# Patient Record
Sex: MALE | Race: WHITE | Employment: FULL TIME | URBAN - NONMETROPOLITAN AREA
[De-identification: names, ages, dates, MRNs, and addresses within clinical notes are randomized per-mention and may not be internally consistent; named-entity substitution may affect disease eponyms.]

---

## 2024-03-12 ENCOUNTER — OFFICE VISIT (OUTPATIENT)
Dept: PRIMARY CARE CLINIC | Age: 59
End: 2024-03-12
Payer: COMMERCIAL

## 2024-03-12 VITALS
DIASTOLIC BLOOD PRESSURE: 74 MMHG | OXYGEN SATURATION: 97 % | WEIGHT: 240.6 LBS | HEART RATE: 70 BPM | HEIGHT: 69 IN | SYSTOLIC BLOOD PRESSURE: 132 MMHG | BODY MASS INDEX: 35.63 KG/M2

## 2024-03-12 DIAGNOSIS — R97.20 ELEVATED PSA: ICD-10-CM

## 2024-03-12 DIAGNOSIS — R74.8 ELEVATED CK: ICD-10-CM

## 2024-03-12 DIAGNOSIS — E78.00 HYPERCHOLESTEROLEMIA: Primary | ICD-10-CM

## 2024-03-12 DIAGNOSIS — Z12.5 SCREENING FOR PROSTATE CANCER: ICD-10-CM

## 2024-03-12 DIAGNOSIS — B35.4 RINGWORM OF BODY: ICD-10-CM

## 2024-03-12 DIAGNOSIS — E03.9 ACQUIRED HYPOTHYROIDISM: ICD-10-CM

## 2024-03-12 DIAGNOSIS — Z13.0 SCREENING, ANEMIA, DEFICIENCY, IRON: ICD-10-CM

## 2024-03-12 DIAGNOSIS — E66.9 OBESITY (BMI 35.0-39.9 WITHOUT COMORBIDITY): ICD-10-CM

## 2024-03-12 PROCEDURE — 1036F TOBACCO NON-USER: CPT | Performed by: NURSE PRACTITIONER

## 2024-03-12 PROCEDURE — G8427 DOCREV CUR MEDS BY ELIG CLIN: HCPCS | Performed by: NURSE PRACTITIONER

## 2024-03-12 PROCEDURE — G8417 CALC BMI ABV UP PARAM F/U: HCPCS | Performed by: NURSE PRACTITIONER

## 2024-03-12 PROCEDURE — 99203 OFFICE O/P NEW LOW 30 MIN: CPT | Performed by: NURSE PRACTITIONER

## 2024-03-12 PROCEDURE — G8484 FLU IMMUNIZE NO ADMIN: HCPCS | Performed by: NURSE PRACTITIONER

## 2024-03-12 PROCEDURE — 3017F COLORECTAL CA SCREEN DOC REV: CPT | Performed by: NURSE PRACTITIONER

## 2024-03-12 RX ORDER — PRENATAL VIT 91/IRON/FOLIC/DHA 28-975-200
COMBINATION PACKAGE (EA) ORAL
Qty: 42 G | Refills: 0 | Status: SHIPPED | OUTPATIENT
Start: 2024-03-12 | End: 2024-03-13 | Stop reason: SDUPTHER

## 2024-03-12 RX ORDER — OMEGA-3/DHA/EPA/FISH OIL 300-1000MG
1 CAPSULE ORAL DAILY
COMMUNITY

## 2024-03-12 RX ORDER — ASPIRIN 81 MG/1
81 TABLET ORAL DAILY
COMMUNITY

## 2024-03-12 RX ORDER — EZETIMIBE 10 MG/1
10 TABLET ORAL DAILY
COMMUNITY
Start: 2023-10-18 | End: 2024-10-17

## 2024-03-12 RX ORDER — ATORVASTATIN CALCIUM 40 MG/1
40 TABLET, FILM COATED ORAL DAILY
COMMUNITY
Start: 2023-10-18 | End: 2024-10-17

## 2024-03-12 RX ORDER — FLUTICASONE PROPIONATE 50 MCG
2 SPRAY, SUSPENSION (ML) NASAL DAILY
COMMUNITY
Start: 2022-03-29

## 2024-03-12 RX ORDER — LEVOTHYROXINE SODIUM 0.2 MG/1
200 TABLET ORAL DAILY
COMMUNITY
Start: 2023-10-18 | End: 2024-10-17

## 2024-03-12 SDOH — ECONOMIC STABILITY: INCOME INSECURITY: HOW HARD IS IT FOR YOU TO PAY FOR THE VERY BASICS LIKE FOOD, HOUSING, MEDICAL CARE, AND HEATING?: NOT HARD AT ALL

## 2024-03-12 SDOH — ECONOMIC STABILITY: FOOD INSECURITY: WITHIN THE PAST 12 MONTHS, THE FOOD YOU BOUGHT JUST DIDN'T LAST AND YOU DIDN'T HAVE MONEY TO GET MORE.: NEVER TRUE

## 2024-03-12 SDOH — ECONOMIC STABILITY: FOOD INSECURITY: WITHIN THE PAST 12 MONTHS, YOU WORRIED THAT YOUR FOOD WOULD RUN OUT BEFORE YOU GOT MONEY TO BUY MORE.: NEVER TRUE

## 2024-03-12 SDOH — ECONOMIC STABILITY: HOUSING INSECURITY
IN THE LAST 12 MONTHS, WAS THERE A TIME WHEN YOU DID NOT HAVE A STEADY PLACE TO SLEEP OR SLEPT IN A SHELTER (INCLUDING NOW)?: NO

## 2024-03-12 ASSESSMENT — PATIENT HEALTH QUESTIONNAIRE - PHQ9
SUM OF ALL RESPONSES TO PHQ QUESTIONS 1-9: 0
2. FEELING DOWN, DEPRESSED OR HOPELESS: 0
1. LITTLE INTEREST OR PLEASURE IN DOING THINGS: 0
SUM OF ALL RESPONSES TO PHQ9 QUESTIONS 1 & 2: 0
SUM OF ALL RESPONSES TO PHQ QUESTIONS 1-9: 0

## 2024-03-12 NOTE — PROGRESS NOTES
[facility] [place]  [address] [address]  Dept: [phone]  Dept Fax: [phone]    Last encounter Visit date not found    New Patient (Establish care. Not fasting for labs- last labs 10/17/23) and Rash (Itchy rash on back, left side. Started after taking atorvastatin )       HPI:   Chu Milligan is a 59 y.o. male who presentstoday for his medical conditions/complaints as noted below.  Chu Milligan is c/o of New Patient (Establish care. Not fasting for labs- last labs 10/17/23) and Rash (Itchy rash on back, left side. Started after taking atorvastatin )      HPI  New patient    59 year old male   Right handed    Children - 4 girls adults none at home.   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   Prior- Coop    Vaccination taken as child: had chicken pox   Covid 19 initial series taken,did not have covid 19 disease.     No nicotine use , never chew never vape  Alcohol use: bourbon, hard cider , wine,beer. 4-6 x year, 1 drink only   Illicit ; never    No hx hospitalization.  No surgeries.       Hx PSA elevation with bx with Dr. Brody in [place]. Last OV 2021   Needs to reconnect due for annual exam   LUTS- denies   Related to PSA Monitor  Component 12/28/21 04/29/21 10/30/20 03/19/20 09/04/19   PSA 3.97 High  5.43 High  5.39 High  4.69 High  4.02 High      Colonoscopy never done   Cologuard always done low risk.   No hemorrhoids, no blood in stool     Back rash- left side - using lamisil     Atorvastatin- high cholesterol     Acquired hypothyroid in high school onset, distal extremity weakness at onset.     TSH 0.50 10/17/2023     10/17/23:    T4, Free  0.7 - 1.7 ng/dL 1.2    Total CK  60 - 225 U/L 250 High      Lamisil using for back rash     ASA 81 mg daily   Trubiotics dust

## 2024-03-12 NOTE — PATIENT INSTRUCTIONS
To Do     1, Shingrix vaccine is a 2 dose series given 2 months apart, local pharmacy this provides 95% lifetime immunity against getting shingles disease.   Hx chicken pox as child makes you eligible.     2. Tdap  (tetanus, diptheria and pertussis) updated at local pharmacy

## 2024-03-13 ENCOUNTER — TELEPHONE (OUTPATIENT)
Dept: PRIMARY CARE CLINIC | Age: 59
End: 2024-03-13

## 2024-03-13 ENCOUNTER — NURSE ONLY (OUTPATIENT)
Dept: PRIMARY CARE CLINIC | Age: 59
End: 2024-03-13
Payer: COMMERCIAL

## 2024-03-13 ENCOUNTER — HOSPITAL ENCOUNTER (OUTPATIENT)
Age: 59
Setting detail: SPECIMEN
Discharge: HOME | End: 2024-03-13
Payer: COMMERCIAL

## 2024-03-13 DIAGNOSIS — Z12.5 SCREENING FOR PROSTATE CANCER: ICD-10-CM

## 2024-03-13 DIAGNOSIS — Z13.29 SCREENING FOR THYROID DISORDER: Primary | ICD-10-CM

## 2024-03-13 DIAGNOSIS — Z13.0 SCREENING, ANEMIA, DEFICIENCY, IRON: ICD-10-CM

## 2024-03-13 DIAGNOSIS — E78.00 HYPERCHOLESTEROLEMIA: ICD-10-CM

## 2024-03-13 DIAGNOSIS — R74.8 ELEVATED CK: ICD-10-CM

## 2024-03-13 DIAGNOSIS — E03.9 ACQUIRED HYPOTHYROIDISM: ICD-10-CM

## 2024-03-13 PROBLEM — E66.9 OBESITY (BMI 35.0-39.9 WITHOUT COMORBIDITY): Status: ACTIVE | Noted: 2024-03-13

## 2024-03-13 PROBLEM — B35.4 RINGWORM OF BODY: Status: ACTIVE | Noted: 2024-03-13

## 2024-03-13 LAB
ALBUMIN SERPL-MCNC: 4.4 G/DL (ref 3.5–5.2)
ALBUMIN/GLOB SERPL: 1.5 {RATIO} (ref 1–2.5)
ALP SERPL-CCNC: 77 U/L (ref 40–129)
ALT SERPL-CCNC: 47 U/L (ref 5–41)
ANION GAP SERPL CALCULATED.3IONS-SCNC: 11 MMOL/L (ref 9–17)
AST SERPL-CCNC: 30 U/L
BASOPHILS # BLD: 0.07 K/UL (ref 0–0.2)
BASOPHILS NFR BLD: 1 % (ref 0–2)
BILIRUB DIRECT SERPL-MCNC: <0.1 MG/DL
BILIRUB INDIRECT SERPL-MCNC: ABNORMAL MG/DL (ref 0–1)
BILIRUB SERPL-MCNC: 0.6 MG/DL (ref 0.3–1.2)
BUN SERPL-MCNC: 17 MG/DL (ref 6–20)
BUN/CREAT SERPL: 21 (ref 9–20)
CALCIUM SERPL-MCNC: 9.4 MG/DL (ref 8.6–10.4)
CHLORIDE SERPL-SCNC: 102 MMOL/L (ref 98–107)
CK SERPL-CCNC: 200 U/L (ref 39–308)
CO2 SERPL-SCNC: 28 MMOL/L (ref 20–31)
CREAT SERPL-MCNC: 0.8 MG/DL (ref 0.7–1.2)
EOSINOPHIL # BLD: 0.29 K/UL (ref 0–0.44)
EOSINOPHILS RELATIVE PERCENT: 6 % (ref 1–4)
ERYTHROCYTE [DISTWIDTH] IN BLOOD BY AUTOMATED COUNT: 12.5 % (ref 11.8–14.4)
GFR SERPL CREATININE-BSD FRML MDRD: >60 ML/MIN/1.73M2
GLUCOSE SERPL-MCNC: 98 MG/DL (ref 70–99)
HCT VFR BLD AUTO: 44.9 % (ref 40.7–50.3)
HGB BLD-MCNC: 14.5 G/DL (ref 13–17)
IMM GRANULOCYTES # BLD AUTO: <0.03 K/UL (ref 0–0.3)
IMM GRANULOCYTES NFR BLD: 0 %
LYMPHOCYTES NFR BLD: 1.78 K/UL (ref 1.1–3.7)
LYMPHOCYTES RELATIVE PERCENT: 35 % (ref 24–43)
MCH RBC QN AUTO: 29.9 PG (ref 25.2–33.5)
MCHC RBC AUTO-ENTMCNC: 32.3 G/DL (ref 28.4–34.8)
MCV RBC AUTO: 92.6 FL (ref 82.6–102.9)
MONOCYTES NFR BLD: 0.5 K/UL (ref 0.1–1.2)
MONOCYTES NFR BLD: 10 % (ref 3–12)
NEUTROPHILS NFR BLD: 48 % (ref 36–65)
NEUTS SEG NFR BLD: 2.38 K/UL (ref 1.5–8.1)
NRBC BLD-RTO: 0 PER 100 WBC
PLATELET # BLD AUTO: 246 K/UL (ref 138–453)
PMV BLD AUTO: 10.1 FL (ref 8.1–13.5)
POTASSIUM SERPL-SCNC: 4.7 MMOL/L (ref 3.7–5.3)
PROT SERPL-MCNC: 7.3 G/DL (ref 6.4–8.3)
RBC # BLD AUTO: 4.85 M/UL (ref 4.21–5.77)
SODIUM SERPL-SCNC: 141 MMOL/L (ref 135–144)
TSH SERPL DL<=0.05 MIU/L-ACNC: 0.65 UIU/ML (ref 0.3–5)
WBC OTHER # BLD: 5 K/UL (ref 3.5–11.3)

## 2024-03-13 PROCEDURE — G0103 PSA SCREENING: HCPCS

## 2024-03-13 PROCEDURE — 80048 BASIC METABOLIC PNL TOTAL CA: CPT

## 2024-03-13 PROCEDURE — 84443 ASSAY THYROID STIM HORMONE: CPT

## 2024-03-13 PROCEDURE — 86376 MICROSOMAL ANTIBODY EACH: CPT

## 2024-03-13 PROCEDURE — 80061 LIPID PANEL: CPT

## 2024-03-13 PROCEDURE — 36415 COLL VENOUS BLD VENIPUNCTURE: CPT | Performed by: NURSE PRACTITIONER

## 2024-03-13 PROCEDURE — 82550 ASSAY OF CK (CPK): CPT

## 2024-03-13 PROCEDURE — 86800 THYROGLOBULIN ANTIBODY: CPT

## 2024-03-13 PROCEDURE — 80076 HEPATIC FUNCTION PANEL: CPT

## 2024-03-13 PROCEDURE — 85025 COMPLETE CBC W/AUTO DIFF WBC: CPT

## 2024-03-13 RX ORDER — PRENATAL VIT 91/IRON/FOLIC/DHA 28-975-200
COMBINATION PACKAGE (EA) ORAL
Qty: 42 G | Refills: 0 | Status: CANCELLED | OUTPATIENT
Start: 2024-03-13

## 2024-03-13 RX ORDER — PRENATAL VIT 91/IRON/FOLIC/DHA 28-975-200
COMBINATION PACKAGE (EA) ORAL
Qty: 42 G | Refills: 0 | Status: SHIPPED | OUTPATIENT
Start: 2024-03-13

## 2024-03-13 ASSESSMENT — ENCOUNTER SYMPTOMS
EYES NEGATIVE: 1
DIARRHEA: 0
SINUS PRESSURE: 0
COUGH: 1
ABDOMINAL PAIN: 0
RHINORRHEA: 0
SINUS PAIN: 0
SORE THROAT: 1

## 2024-03-13 NOTE — TELEPHONE ENCOUNTER
----- Message from Laurel Child sent at 3/13/2024  4:16 PM EDT -----  Subject: Refill Request    QUESTIONS  Name of Medication? terbinafine (LAMISIL) 1 % cream  Patient-reported dosage and instructions? Apply topically 2 times daily to   left posterior back ringworm x 10 days and   How many days do you have left? 0  Preferred Pharmacy? [id number]  Pharmacy phone number (if available)? [phone]  Additional Information for Provider? This medication was sent to [facility]   pharmacy, but "[facility] doesn't accept patients insurance anymore please   advise and would like it sent over to [facility].   ---------------------------------------------------------------------------  --------------  CALL BACK INFO  What is the best way for the office to contact you? OK to leave message on   voicemail  Preferred Call Back Phone Number? [phone]  ---------------------------------------------------------------------------  --------------  SCRIPT ANSWERS  Relationship to Patient? Spouse/Partner  Representative Name? Lyubov Milligan  Is the representative on the Communication Release of Information (HERMELINDA)   form in Epic? Yes

## 2024-03-14 LAB
CHOLEST SERPL-MCNC: 155 MG/DL (ref 0–199)
CHOLESTEROL/HDL RATIO: 4
HDLC SERPL-MCNC: 40 MG/DL
LDLC SERPL CALC-MCNC: 91 MG/DL (ref 0–100)
PSA SERPL-MCNC: 3.8 NG/ML (ref 0–4)
TRIGL SERPL-MCNC: 116 MG/DL
VLDLC SERPL CALC-MCNC: 23 MG/DL

## 2024-03-15 DIAGNOSIS — E78.00 HYPERCHOLESTEROLEMIA: ICD-10-CM

## 2024-03-15 DIAGNOSIS — R74.8 ELEVATED CK: Primary | ICD-10-CM

## 2024-03-15 RX ORDER — ROSUVASTATIN CALCIUM 20 MG/1
20 TABLET, COATED ORAL NIGHTLY
Qty: 90 TABLET | Refills: 0 | Status: SHIPPED | OUTPATIENT
Start: 2024-03-15

## 2024-03-17 LAB
THYROGLOBULIN AB: 40 IU/ML (ref 0–40)
THYROPEROXIDASE AB SERPL IA-ACNC: 174 IU/ML (ref 0–25)

## 2024-03-27 ASSESSMENT — ENCOUNTER SYMPTOMS
ENDOCRINE NEGATIVE: 1
NAUSEA: 0
SHORTNESS OF BREATH: 0
ALLERGIC/IMMUNOLOGIC NEGATIVE: 1
FEVER: 0
COUGH: 0
PSYCHIATRIC NEGATIVE: 1
CHILLS: 0
DIFFICULTY URINATING: 0
EYES NEGATIVE: 1

## 2024-03-27 NOTE — PROGRESS NOTES
Subjective   Patient ID: Gama Escobar is a 59 y.o. male.    HPI  Patient has hx of elevated PSA. Most recent PSA was 3.80 on 3/24. Prior PSA was 3.97 on 12/21..Prior PSA was 5.43 on 4/2021. Prior PSA was 5.39 on 10/2020. Prior PSA was 4.69 on 9/2019. Patient had a negative bx on 6/2020. No fhx of prostate ca. . Denies weight loss. Denies bone pain. Chronic BPH sx are mild and stable. Denies urgency and frequency. Denies dysuria. Denies hematuria. Nocturia x1. Pt. does not take any medication for LUT'S. ED is mild. No medication for this.       Review of Systems   Constitutional:  Negative for chills and fever.   HENT: Negative.     Eyes: Negative.    Respiratory:  Negative for cough and shortness of breath.    Cardiovascular:  Negative for chest pain and leg swelling.   Gastrointestinal:  Negative for nausea.   Endocrine: Negative.    Genitourinary:  Negative for difficulty urinating.        Negative except for documented in HPI   Allergic/Immunologic: Negative.    Neurological:         Alert & oriented X 3   Hematological:         Denies blood thinners   Psychiatric/Behavioral: Negative.         Objective   Physical Exam  Vitals and nursing note reviewed.   Constitutional:       General: He is not in acute distress.     Appearance: Normal appearance.   Pulmonary:      Effort: Pulmonary effort is normal.   Abdominal:      Tenderness: There is no abdominal tenderness.   Genitourinary:     Comments: Kidneys non palpable bilaterally  Bladder non palpable or tender  Scrotum no mass, No hydrocele  Epididymis- No spermatocele. Non Tender.  Testicles: No mass. WNL  Urethra: No discharge  Penis within normal limits... No lesions. circumcised  Prostate - symmetric, no nodules. BENIGN  Seminal Vesicals: No mass.  Sphincter tone: normal  Neurological:      Mental Status: He is alert.         Assessment/Plan   Diagnoses and all orders for this visit:  Nocturia  Erectile dysfunction, unspecified erectile dysfunction  type  Elevated PSA  Benign prostatic hyperplasia without urinary obstruction      All available PSA values reviewed, Options discussed. Questions answered.  Discussed MRI  Pros and cons of prostate biopsy reviewed. Other options discussed. Questions answered  Past Bx reviewed   Diet changes for prostate health discussed and educational information given. Pros/Cons of prostate health supplements discussed.   Treatment options for LUTS reviewed  Discussed timed voiding. Discussed fluid and caffeine intake  Treatment options for ED reviewed.  Lifestyle change to help prevent UTIs discussed. Encouraged fluid intake.    F/U  1 year with PSA

## 2024-03-28 ENCOUNTER — OFFICE VISIT (OUTPATIENT)
Dept: UROLOGY | Facility: CLINIC | Age: 59
End: 2024-03-28
Payer: COMMERCIAL

## 2024-03-28 VITALS — HEIGHT: 68 IN | RESPIRATION RATE: 16 BRPM | WEIGHT: 245 LBS | BODY MASS INDEX: 37.13 KG/M2

## 2024-03-28 DIAGNOSIS — N52.9 ERECTILE DYSFUNCTION, UNSPECIFIED ERECTILE DYSFUNCTION TYPE: ICD-10-CM

## 2024-03-28 DIAGNOSIS — R35.1 NOCTURIA: ICD-10-CM

## 2024-03-28 DIAGNOSIS — R97.20 ELEVATED PSA: ICD-10-CM

## 2024-03-28 DIAGNOSIS — N40.0 BENIGN PROSTATIC HYPERPLASIA WITHOUT URINARY OBSTRUCTION: ICD-10-CM

## 2024-03-28 PROCEDURE — 99214 OFFICE O/P EST MOD 30 MIN: CPT | Performed by: UROLOGY

## 2024-03-28 PROCEDURE — 1036F TOBACCO NON-USER: CPT | Performed by: UROLOGY

## 2024-03-28 RX ORDER — EZETIMIBE 10 MG/1
1 TABLET ORAL DAILY
COMMUNITY
Start: 2023-10-18 | End: 2024-10-17

## 2024-03-28 RX ORDER — ROSUVASTATIN CALCIUM 20 MG/1
20 TABLET, COATED ORAL
COMMUNITY
Start: 2024-03-15

## 2024-03-28 RX ORDER — LEVOTHYROXINE SODIUM 200 UG/1
1 TABLET ORAL DAILY
COMMUNITY
Start: 2023-10-18 | End: 2024-10-17

## 2024-03-28 RX ORDER — ASPIRIN 81 MG/1
1 TABLET ORAL DAILY
COMMUNITY

## 2024-03-28 RX ORDER — SAW PALMETTO 160 MG
CAPSULE ORAL
COMMUNITY

## 2024-03-28 RX ORDER — CIDER VINEGAR 300 MG
1 TABLET ORAL DAILY
COMMUNITY

## 2024-03-28 RX ORDER — FLUTICASONE PROPIONATE 50 MCG
2 SPRAY, SUSPENSION (ML) NASAL DAILY
COMMUNITY
Start: 2022-03-29

## 2024-03-28 RX ORDER — CALCIUM CARBONATE 600 MG
600 TABLET ORAL
COMMUNITY

## 2024-03-28 RX ORDER — ATORVASTATIN CALCIUM 40 MG/1
1 TABLET, FILM COATED ORAL DAILY
COMMUNITY
Start: 2023-10-18 | End: 2024-10-17

## 2024-04-25 ENCOUNTER — OFFICE VISIT (OUTPATIENT)
Dept: PRIMARY CARE CLINIC | Age: 59
End: 2024-04-25
Payer: COMMERCIAL

## 2024-04-25 VITALS
HEART RATE: 71 BPM | DIASTOLIC BLOOD PRESSURE: 82 MMHG | OXYGEN SATURATION: 97 % | BODY MASS INDEX: 36.14 KG/M2 | SYSTOLIC BLOOD PRESSURE: 118 MMHG | HEIGHT: 69 IN | WEIGHT: 244 LBS

## 2024-04-25 DIAGNOSIS — E66.9 OBESITY (BMI 35.0-39.9 WITHOUT COMORBIDITY): Primary | ICD-10-CM

## 2024-04-25 DIAGNOSIS — E03.9 ACQUIRED HYPOTHYROIDISM: ICD-10-CM

## 2024-04-25 DIAGNOSIS — B35.4 TINEA CORPORIS: ICD-10-CM

## 2024-04-25 PROCEDURE — 99213 OFFICE O/P EST LOW 20 MIN: CPT | Performed by: NURSE PRACTITIONER

## 2024-04-25 PROCEDURE — G8417 CALC BMI ABV UP PARAM F/U: HCPCS | Performed by: NURSE PRACTITIONER

## 2024-04-25 PROCEDURE — 1036F TOBACCO NON-USER: CPT | Performed by: NURSE PRACTITIONER

## 2024-04-25 PROCEDURE — 3017F COLORECTAL CA SCREEN DOC REV: CPT | Performed by: NURSE PRACTITIONER

## 2024-04-25 PROCEDURE — G8427 DOCREV CUR MEDS BY ELIG CLIN: HCPCS | Performed by: NURSE PRACTITIONER

## 2024-04-25 RX ORDER — PRENATAL VIT 91/IRON/FOLIC/DHA 28-975-200
COMBINATION PACKAGE (EA) ORAL
Qty: 42 G | Refills: 0 | Status: SHIPPED | OUTPATIENT
Start: 2024-04-25

## 2024-04-25 NOTE — PATIENT INSTRUCTIONS
THANK YOU FOR TRUSTING US WITH YOUR HEALTHCARE !!    The associates caring for you today were:    Family Practice Provider: Kayla TOVAR-SACHIN    Clinical Team Nurse: Heather Burks LPN    Clinical Team Medical Assistant: Brittni Juarez MA    Our goal is to provide you with EXCEPTIONAL patient care, and we strive to do this for EVERY patient, EVERY visit. Since we care about you and your experience, you may receive a patient satisfaction survey from Violeta Mcknight by postal mail/email/text. We truly welcome your feedback and ask that you complete the survey to let us know how we are doing.    Important Numbers:  [facility] phone [phone]   [place] [facility] Nurse/MA Line: [phone]  Fax  [phone]   Central [phone]  Billing questions: [phone]  Medical Records Request: [phone]    Manage your healthcare  with [facility] My[facility]. To activate your account, visit [email or web address]   DIGITAL scheduling available now through my chart.  Schedule your next appointment at your convenience through your my chart.       [place] [facility] Hours:  Monday: [place] office location 8-5 ([phone]) Offering additional late hours the first Monday of the month until 7 pm.   Tuesday: 8: :  812 Noon, closed  of the month   : 7:30-4:30   SURVEY:    You may be receiving a survey from Violeta Mcknight regarding your visit today.    Please complete the survey to enable us to provide the highest quality of care to you and your family.    If you cannot score us a very good on any question, please call the office to discuss how we could have made your experience a very good one.    Thank you.

## 2024-04-25 NOTE — PROGRESS NOTES
spray 2 sprays by Nasal route daily 3/29/22  Yes Provider, MD Ceci   levothyroxine (SYNTHROID) 200 MCG tablet Take 1 tablet by mouth daily 10/18/23 10/17/24 Yes Provider, MD Ceci        Allergies:       Patient has no known allergies.    Social History:     Tobacco:    reports that he has never smoked. He has never used smokeless tobacco.  Alcohol:      reports current alcohol use of about 1.0 standard drink of alcohol per week.  Drug Use:  reports no history of drug use.    Family History:     Family History   Problem Relation Age of Onset    Diabetes Mother     High Cholesterol Mother     Osteoarthritis Mother         knee replacement    Heart Disease Father     High Cholesterol Father     Heart Surgery Father         stents heart , carotid stent 1 side    Kidney Disease Maternal Grandmother     Heart Disease Paternal Grandfather        Review of Systems:         Review of Systems   Constitutional: Negative.    HENT:  Negative for congestion, ear pain and sneezing.    Eyes: Negative.    Respiratory:  Negative for cough, chest tightness, shortness of breath and wheezing.    Cardiovascular:  Negative for chest pain, palpitations and leg swelling.   Gastrointestinal:  Negative for abdominal distention and abdominal pain.   Endocrine: Negative for cold intolerance and heat intolerance.   Genitourinary:  Negative for difficulty urinating.   Skin:  Negative for rash.   Neurological:  Negative for dizziness, syncope, light-headedness and headaches.   Psychiatric/Behavioral:  Negative for agitation, behavioral problems and sleep disturbance.            Physical Exam:     Vitals:  /82 (Site: Left Upper Arm, Position: Sitting)   Pulse 71   Ht 1.753 m (5' 9\")   Wt 110.7 kg (244 lb)   SpO2 97%   BMI 36.03 kg/m²       Physical Exam          Data:     Lab Results   Component Value Date/Time     03/13/2024 08:15 AM    K 4.7 03/13/2024 08:15 AM     03/13/2024 08:15 AM    CO2 28 03/13/2024

## 2024-04-26 ASSESSMENT — ENCOUNTER SYMPTOMS
EYES NEGATIVE: 1
CHEST TIGHTNESS: 0
SHORTNESS OF BREATH: 0
ABDOMINAL DISTENTION: 0
ABDOMINAL PAIN: 0
COUGH: 0
WHEEZING: 0

## 2024-05-28 RX ORDER — ROSUVASTATIN CALCIUM 20 MG/1
TABLET, COATED ORAL
Qty: 90 TABLET | Refills: 3 | Status: SHIPPED | OUTPATIENT
Start: 2024-05-28

## 2024-05-28 NOTE — TELEPHONE ENCOUNTER
Last OV: 4/25/2024  Last RX: 3/15/2024   Next scheduled apt: 10/23/2024      Surescripts requesting refill

## 2024-10-09 RX ORDER — ROSUVASTATIN CALCIUM 20 MG/1
TABLET, COATED ORAL
Qty: 90 TABLET | Refills: 3 | Status: CANCELLED | OUTPATIENT
Start: 2024-10-09

## 2024-10-09 NOTE — TELEPHONE ENCOUNTER
Last OV: 4/25/2024  Last RX: 5/28/2024   Next scheduled apt: 10/23/2024      Pt is asking why he needs to be on cholesterol medication when he does not have high cholesterol.  If you would like him to stay on the medication please send to [facility] in [place].

## 2024-10-10 NOTE — TELEPHONE ENCOUNTER
Chart review pt's LDL cholesterol has been elevated in the past (has been on statin drug atorvastatin for several years and I changed him to crestor (rosuvastatin) both drugs are used to lower cholesterol the rosuvastatin is water philic in how it moves around in the body and has less risk of side effects .     Chu has a strong family history on his father's side of heart disease.   Please also realize his ASCVD risk is elevated at 8% which means 8 people out of 100 with same risk vascular risk factors as chu are at risk for a vascular event (heart attack or stroke) in the next 10 years.   The american heart association/cardiologist deem a moderate to high dose statin is recommended to lower his risk of a vascular event anytime it is over 7%     The 10-year ASCVD risk score (Cornelius GAGE, et al., 2019) is: 7.7%    Values used to calculate the score:      Age: 59 years      Sex: Male      Is Non- : No      Diabetic: No      Tobacco smoker: No      Systolic Blood Pressure: 132 mmHg      Is BP treated: No      HDL Cholesterol: 40 mg/dL      Total Cholesterol: 155 mg/dL      Olvin cholesterol numbers are at goal because he is taking medication to keep them improved.   Otherwise the bad cholesterol LDL would be twice what it is    Lab Results   Component Value Date    CHOL 155 03/13/2024     Lab Results   Component Value Date    TRIG 116 03/13/2024     Lab Results   Component Value Date    HDL 40 (L) 03/13/2024     Lab Results   Component Value Date    LDL 91 03/13/2024     Lab Results   Component Value Date    VLDL 23 03/13/2024     Lab Results   Component Value Date    CHOLHDLRATIO 4.0 03/13/2024     It is a choice if he wants to stay on it but recommendations are given in details within this discussion.     If pt wants to continue will send refill at current dose which has his cholesterol well controlled to pharmacy   Next labs due in 6 months  If pt wants to discuss further visit in

## 2024-10-14 RX ORDER — ROSUVASTATIN CALCIUM 20 MG/1
20 TABLET, COATED ORAL DAILY
Qty: 90 TABLET | Refills: 3 | Status: SHIPPED | OUTPATIENT
Start: 2024-10-14

## 2024-12-10 RX ORDER — EZETIMIBE 10 MG/1
10 TABLET ORAL DAILY
Qty: 90 TABLET | Refills: 3 | Status: SHIPPED | OUTPATIENT
Start: 2024-12-10 | End: 2025-12-10

## 2024-12-10 RX ORDER — LEVOTHYROXINE SODIUM 200 UG/1
200 TABLET ORAL DAILY
Qty: 90 TABLET | Refills: 3 | Status: SHIPPED | OUTPATIENT
Start: 2024-12-10 | End: 2025-12-10

## 2024-12-10 NOTE — TELEPHONE ENCOUNTER
Last OV: 4/25/2024  Last RX: 10/17/2023   Next scheduled apt: 12/12/2024    Pt requesting refill

## 2024-12-12 ENCOUNTER — OFFICE VISIT (OUTPATIENT)
Dept: PRIMARY CARE CLINIC | Age: 59
End: 2024-12-12

## 2024-12-12 VITALS
WEIGHT: 237 LBS | SYSTOLIC BLOOD PRESSURE: 130 MMHG | DIASTOLIC BLOOD PRESSURE: 86 MMHG | OXYGEN SATURATION: 98 % | BODY MASS INDEX: 35.1 KG/M2 | HEIGHT: 69 IN | HEART RATE: 70 BPM

## 2024-12-12 DIAGNOSIS — L81.9 CHANGING PIGMENTED SKIN LESION: ICD-10-CM

## 2024-12-12 DIAGNOSIS — E78.00 HYPERCHOLESTEROLEMIA: ICD-10-CM

## 2024-12-12 DIAGNOSIS — E03.9 ACQUIRED HYPOTHYROIDISM: Primary | ICD-10-CM

## 2024-12-12 PROCEDURE — 99212 OFFICE O/P EST SF 10 MIN: CPT | Performed by: NURSE PRACTITIONER

## 2024-12-12 ASSESSMENT — ENCOUNTER SYMPTOMS
SHORTNESS OF BREATH: 0
WHEEZING: 0
CHEST TIGHTNESS: 0
ABDOMINAL PAIN: 0
COUGH: 0
EYES NEGATIVE: 1
ABDOMINAL DISTENTION: 0

## 2024-12-12 NOTE — PATIENT INSTRUCTIONS
THANK YOU FOR TRUSTING US WITH YOUR HEALTHCARE !!    The associates caring for you today were:    Family Practice Provider: Kayla TOVAR-SACHIN    Clinical Team Nurse: Heather Burks LPN    Clinical Team Medical Assistant: Brittni Juarez MA    Our goal is to provide you with EXCEPTIONAL patient care, and we strive to do this for EVERY patient, EVERY visit. Since we care about you and your experience, you may receive a patient satisfaction survey from Violeta Mcknight by postal mail/email/text. We truly welcome your feedback and ask that you complete the survey to let us know how we are doing.    Important Numbers:  [facility] phone [phone]   [place] Office Nurse/MA Line: [phone]  Fax  [phone]   Central [phone]  Billing questions: [phone]  Medical Records Request: [phone]    Manage your healthcare  with [facility] My[facility]. To activate your account, visit [email or web address]   DIGITAL scheduling available now through my chart.  Schedule your next appointment at your convenience through your my chart.       [place] Office Hours:  Monday: [place] office location 8-5 ([phone]) Offering additional late hours the first Monday of the month until 7 pm.   Tuesday: 8: :  812 Noon, closed  of the month   : 7:30-4:30   SURVEY:    You may be receiving a survey from Violeta Mcknight regarding your visit today.    Please complete the survey to enable us to provide the highest quality of care to you and your family.    If you cannot score us a very good on any question, please call the office to discuss how we could have made your experience a very good one.    Thank you.

## 2024-12-12 NOTE — PROGRESS NOTES
and oriented to person, place, and time.   Psychiatric:         Behavior: Behavior normal.         Thought Content: Thought content normal.         Judgment: Judgment normal.           0.8 cm flat macular lesion on right forehead. Increase size and change in color   No family hx skin cancer or melanoma     Right forehead lesion     Data:     Lab Results   Component Value Date/Time     03/13/2024 08:15 AM    K 4.7 03/13/2024 08:15 AM     03/13/2024 08:15 AM    CO2 28 03/13/2024 08:15 AM    BUN 17 03/13/2024 08:15 AM    CREATININE 0.8 03/13/2024 08:15 AM    GLUCOSE 98 03/13/2024 08:15 AM    BILITOT 0.6 03/13/2024 08:15 AM    ALKPHOS 77 03/13/2024 08:15 AM    AST 30 03/13/2024 08:15 AM    ALT 47 03/13/2024 08:15 AM     Lab Results   Component Value Date/Time    WBC 5.0 03/13/2024 08:15 AM    RBC 4.85 03/13/2024 08:15 AM    HGB 14.5 03/13/2024 08:15 AM    HCT 44.9 03/13/2024 08:15 AM    MCV 92.6 03/13/2024 08:15 AM    MCH 29.9 03/13/2024 08:15 AM    MCHC 32.3 03/13/2024 08:15 AM    RDW 12.5 03/13/2024 08:15 AM     03/13/2024 08:15 AM    MPV 10.1 03/13/2024 08:15 AM     Lab Results   Component Value Date/Time    TSH 0.65 03/13/2024 08:15 AM     Lab Results   Component Value Date/Time    CHOL 155 03/13/2024 08:15 AM    LDL 91 03/13/2024 08:15 AM    HDL 40 03/13/2024 08:15 AM    PSA 3.80 03/13/2024 08:15 AM          Assessment & Plan        Diagnosis Orders   1. Acquired hypothyroidism        2. Hypercholesterolemia        3. Changing pigmented skin lesion              Continue statin/zetia and thyroid supplement at current doses  Labs due will do consumer labs     Will refer to Dr. Sampson for skin lesion removal or dermatologist of choice.               Completed Refills   Requested Prescriptions      No prescriptions requested or ordered in this encounter     No follow-ups on file.  No orders of the defined types were placed in this encounter.    No orders of the defined types were placed in this

## 2025-01-31 LAB
A/G RATIO, EXTERNAL: NORMAL
ALBUMIN, EXTERNAL: 4.5
ALK PHOS, EXTERNAL: 72
ANION GAP, EXTERNAL: 10
BILI DIRECT, EXTERNAL: NORMAL
BILI TOTAL, EXTERNAL: 0.6
BUN, EXTERNAL: 17
BUN/CREATININE RATIO, EXTERNAL: 28
CALCIUM, EXTERNAL: 9.1
CALCIUM, ION, EXTERNAL: 9.1
CHLORIDE, EXTERNAL: 103
CHOLESTEROL, TOTAL: 108 MG/DL
CHOLESTEROL/HDL RATIO: 2.6
CO2, EXTERNAL: 26
CREATININE, EXTERNAL: 0.6
EGFR IF AFA, EXTERNAL: NORMAL
EGFR IF NONAFRICAN AMERICAN: >90
GLOBULIN, EXTERNAL: NORMAL
GLUCOSE SER, EXTERNAL: 98
HDLC SERPL-MCNC: 42 MG/DL (ref 35–70)
LDL CHOLESTEROL: 50
NONHDLC SERPL-MCNC: NORMAL MG/DL
POTASSIUM, EXTERNAL: 3.8
PROTEIN TOT, EXTERNAL: 7.3
SGOT (AST), EXTERNAL: 26
SGPT (ALT), EXTERNAL: 38
SODIUM, EXTERNAL: 139
TRIGL SERPL-MCNC: 78 MG/DL
VLDLC SERPL CALC-MCNC: 16 MG/DL

## 2025-02-14 DIAGNOSIS — E78.00 HYPERCHOLESTEROLEMIA: ICD-10-CM

## 2025-03-31 ENCOUNTER — APPOINTMENT (OUTPATIENT)
Dept: UROLOGY | Facility: CLINIC | Age: 60
End: 2025-03-31
Payer: COMMERCIAL